# Patient Record
Sex: MALE | Race: WHITE | URBAN - METROPOLITAN AREA
[De-identification: names, ages, dates, MRNs, and addresses within clinical notes are randomized per-mention and may not be internally consistent; named-entity substitution may affect disease eponyms.]

---

## 2019-01-21 RX ORDER — OMEPRAZOLE 40 MG/1
CAPSULE, DELAYED RELEASE ORAL
Qty: 90 CAPSULE | Refills: 3 | OUTPATIENT
Start: 2019-01-21

## 2021-04-08 DIAGNOSIS — Z23 ENCOUNTER FOR IMMUNIZATION: ICD-10-CM

## 2025-04-23 ENCOUNTER — TELEPHONE (OUTPATIENT)
Age: 70
End: 2025-04-23

## 2025-04-23 NOTE — TELEPHONE ENCOUNTER
Patient called to schedule NP consult for sleep apnea-    He is now scheduled for 5/22/25 in [place] with Krystin

## 2025-05-22 ENCOUNTER — OFFICE VISIT (OUTPATIENT)
Dept: SLEEP CENTER | Facility: CLINIC | Age: 70
End: 2025-05-22

## 2025-05-22 VITALS
DIASTOLIC BLOOD PRESSURE: 84 MMHG | OXYGEN SATURATION: 96 % | SYSTOLIC BLOOD PRESSURE: 132 MMHG | WEIGHT: 232 LBS | BODY MASS INDEX: 31.42 KG/M2 | HEART RATE: 58 BPM | HEIGHT: 72 IN

## 2025-05-22 DIAGNOSIS — R06.83 SNORING: ICD-10-CM

## 2025-05-22 DIAGNOSIS — G47.19 EXCESSIVE DAYTIME SLEEPINESS: Primary | ICD-10-CM

## 2025-05-22 DIAGNOSIS — E66.811 OBESITY (BMI 30.0-34.9): ICD-10-CM

## 2025-05-22 DIAGNOSIS — G47.59 SEXSOMNIA: ICD-10-CM

## 2025-05-22 PROBLEM — G56.01 CARPAL TUNNEL SYNDROME OF RIGHT WRIST: Status: ACTIVE | Noted: 2020-01-03

## 2025-05-22 PROBLEM — Z86.0100 HISTORY OF COLONIC POLYPS: Status: ACTIVE | Noted: 2025-05-22

## 2025-05-22 PROBLEM — K64.8 OTHER HEMORRHOIDS: Status: ACTIVE | Noted: 2022-08-05

## 2025-05-22 PROBLEM — R74.01 ELEVATED TRANSAMINASE LEVEL: Status: ACTIVE | Noted: 2022-01-17

## 2025-05-22 PROBLEM — R73.01 IFG (IMPAIRED FASTING GLUCOSE): Status: ACTIVE | Noted: 2019-07-16

## 2025-05-22 PROBLEM — R35.1 NOCTURIA: Status: ACTIVE | Noted: 2018-07-10

## 2025-05-22 PROBLEM — N39.3 STRESS INCONTINENCE OF URINE: Status: ACTIVE | Noted: 2024-11-08

## 2025-05-22 PROBLEM — I10 HYPERTENSION: Status: ACTIVE | Noted: 2025-05-22

## 2025-05-22 PROBLEM — R31.0 GROSS HEMATURIA: Status: ACTIVE | Noted: 2023-10-30

## 2025-05-22 PROBLEM — K59.09 CONSTIPATION, CHRONIC: Status: ACTIVE | Noted: 2025-05-22

## 2025-05-22 PROBLEM — N52.31 ERECTILE DYSFUNCTION FOLLOWING RADICAL PROSTATECTOMY: Status: ACTIVE | Noted: 2017-10-30

## 2025-05-22 PROBLEM — I77.811 AORTIC ECTASIA, ABDOMINAL (HCC): Status: ACTIVE | Noted: 2024-03-29

## 2025-05-22 PROBLEM — R39.15 URINARY URGENCY: Status: ACTIVE | Noted: 2017-10-04

## 2025-05-22 PROBLEM — C44.90 NONMELANOMA SKIN CANCER: Status: ACTIVE | Noted: 2025-05-22

## 2025-05-22 PROBLEM — K22.70 BARRETT'S ESOPHAGUS WITHOUT DYSPLASIA: Status: ACTIVE | Noted: 2019-07-16

## 2025-05-22 PROBLEM — Z71.9 ENCOUNTER FOR EDUCATION: Status: ACTIVE | Noted: 2025-05-22

## 2025-05-22 PROBLEM — U07.1 COVID-19: Status: ACTIVE | Noted: 2025-05-22

## 2025-05-22 PROBLEM — N30.40 RADIATION CYSTITIS: Status: ACTIVE | Noted: 2024-01-31

## 2025-05-22 PROBLEM — R33.9 INCOMPLETE EMPTYING OF BLADDER: Status: ACTIVE | Noted: 2018-07-10

## 2025-05-22 PROBLEM — K21.9 GERD (GASTROESOPHAGEAL REFLUX DISEASE): Status: ACTIVE | Noted: 2025-05-22

## 2025-05-22 PROBLEM — Z87.891 PERSONAL HISTORY OF TOBACCO USE, PRESENTING HAZARDS TO HEALTH: Status: ACTIVE | Noted: 2018-12-27

## 2025-05-22 PROBLEM — R13.10 DYSPHAGIA: Status: ACTIVE | Noted: 2025-05-22

## 2025-05-22 RX ORDER — KETOCONAZOLE 20 MG/ML
1 SHAMPOO, SUSPENSION TOPICAL 2 TIMES WEEKLY
COMMUNITY
Start: 2010-01-01

## 2025-05-22 RX ORDER — FLUTICASONE PROPIONATE 50 MCG
2 SPRAY, SUSPENSION (ML) NASAL DAILY
COMMUNITY
Start: 2010-01-01

## 2025-05-22 RX ORDER — LOSARTAN POTASSIUM 50 MG/1
50 TABLET ORAL DAILY
COMMUNITY
Start: 2010-01-01

## 2025-05-22 RX ORDER — SILDENAFIL CITRATE 20 MG/1
20 TABLET ORAL AS NEEDED
COMMUNITY
Start: 2015-01-01

## 2025-05-22 RX ORDER — DICLOFENAC SODIUM 100 MG/1
100 TABLET, EXTENDED RELEASE ORAL AS NEEDED
COMMUNITY
Start: 2010-01-01

## 2025-05-22 RX ORDER — OMEPRAZOLE 40 MG/1
40 CAPSULE, DELAYED RELEASE ORAL DAILY
COMMUNITY
Start: 2010-01-01

## 2025-05-22 RX ORDER — LORATADINE 10 MG/1
1 CAPSULE, LIQUID FILLED ORAL DAILY PRN
COMMUNITY

## 2025-05-22 RX ORDER — ROSUVASTATIN CALCIUM 5 MG/1
5 TABLET, COATED ORAL DAILY
COMMUNITY

## 2025-05-22 NOTE — PROGRESS NOTES
Name: Roman Venegas      : 1955      MRN: [id number]  Encounter Provider: Krystin Yo PA-C  Encounter Date: 2025   Encounter department: [facility] [place]    :  Assessment & Plan  Excessive daytime sleepiness  Patient has symptoms which could be consistent with obstructive sleep apnea including snoring and excessive daytime sleepiness.  Physical exam findings include a BMI of 31, narrow airway, and high arched palate.  Home sleep study was ordered today.  If he was found to have sleep apnea, CPAP would be recommended.  If his sleep study is inconclusive or negative, I would recommend completing an in lab sleep study.  Orders:    Home Sleep Study; Future    Sexsomnia  Patient has been masturbating in his sleep for the past 1 to 2 months almost nightly.  We discussed it is possible that undiagnosed and untreated obstructive sleep apnea can lead to this behavior.  Will reevaluate symptoms after he completes the sleep study and treat sleep apnea if needed.       Snoring    Orders:    Home Sleep Study; Future    Obesity (BMI 30.0-34.9)  We discussed that obesity can increase his risk for obstructive sleep apnea.  Orders:    Home Sleep Study; Future      History of Present Illness     HPI Roman Venegas is a 69-year-old male with a significant past medical history of hypertension, abdominal aortic ectasia, GERD, Woodard's esophagitis, impaired fasting glucose, history of prostate cancer status post prostatectomy and radiation treatment who presents today for multiple sleep complaints.  Patient states he has been snoring and experiencing excessive daytime sleepiness most of his life.  He previously had a mandible advancing device made by his dentist for teeth grinding and snoring, which he felt to be helpful for his snoring.  He now has an over-the-counter device which he uses on occasion.  Although he has been excessively sleepy and snoring most of his life, he never completed a  sleep study or use CPAP previously.  He has 5 siblings and states 3 of his siblings use CPAP.    His main concern today is that his wife has noted in the past 1 to 2 months that he is masturbating in his sleep most nights approximately 30 minutes into his sleep.  He states he is completely unaware that this is occurring.  He states this upsets his wife.  She will either leave the bedroom or will wake him.  Patient states since having his prostate removed approximately 10 years ago, typically urine will come out of his penis when he is erect.  He states during these episodes in his sleep, he is not ejaculating or urinating.  He states if his wife wakes him he is able to return to sleep quickly and does not resume masturbation.  Other than snoring, his wife does not have any other complaints regarding his sleep.  She has not mentioned breathing pauses, gasping, choking, yelling out in his sleep, sleepwalking, sleep talking, or dream enactment behavior.  Patient states at times he has a vivid dream typically between 3 and 5 AM that he is urinating.  He states he does wake to go urinate in the bathroom.  He has not had any bedwetting since childhood.  He does describe himself as an excessively sleepy person.  He states he is always been able to fall asleep very easily if he is in a comfortable position.  He is currently retired and has been for the past 11 years and naps daily.    Sleep schedule  Patient is retired   Lives with his wife and shares a bed with her  Bedtime: 11 PM  Sleeps within 15 minutes or less  Almost a nightly occurrence of masturbation occurs in his sleep for the past 1 to 2 months  Wakes 2 times per night to urinate  Wake up time: 7 AM  Naps daily in his recliner for approximately 1 hour in the afternoon  Eats dinner between 5 and 6 PM and typically has 2 or 3 alcoholic beverages  Naps again briefly 10 to 15 minutes around 7 PM  Can fall asleep easily if comfortable    Sleep study/CPAP  No prior  use  Has an over-the-counter mandible advancing device which he uses occasionally    Sitting and reading: (Patient-Rptd) Moderate chance of dozing  Watching TV: (Patient-Rptd) Moderate chance of dozing  Sitting, inactive in a public place (e.g. a theatre or a meeting): (Patient-Rptd) Moderate chance of dozing  As a passenger in a car for an hour without a break: (Patient-Rptd) Moderate chance of dozing  Lying down to rest in the afternoon when circumstances permit: (Patient-Rptd) High chance of dozing  Sitting and talking to someone: (Patient-Rptd) Slight chance of dozing  Sitting quietly after a lunch without alcohol: (Patient-Rptd) Moderate chance of dozing  In a car, while stopped for a few minutes in traffic: (Patient-Rptd) Would never doze  Total score: (Patient-Rptd) 14       Review of Systems   Constitutional:  Negative for fatigue and unexpected weight change.   HENT:  Negative for congestion, nosebleeds and rhinorrhea.    Respiratory:  Negative for cough, shortness of breath and wheezing.    Cardiovascular:  Negative for palpitations and leg swelling.   Allergic/Immunologic: Negative for environmental allergies.   Neurological:  Negative for headaches.   Psychiatric/Behavioral:  Positive for sleep disturbance. Negative for decreased concentration. The patient is not nervous/anxious.      Pertinent positives/negatives included in HPI and also as noted:       Social History[1]  Objective   /84 (BP Location: Left arm, Patient Position: Sitting, Cuff Size: Large)   Pulse 58   Ht 6' (1.829 m)   Wt 105 kg (232 lb)   SpO2 96%   BMI 31.46 kg/m²     Neck Circumference: 18  Physical Exam  Visit Vitals  /84 (BP Location: Left arm, Patient Position: Sitting, Cuff Size: Large)   Pulse 58   Ht 6' (1.829 m)   Wt 105 kg (232 lb)   SpO2 96%   BMI 31.46 kg/m²   BSA 2.27 m²       Mallampati Grade : Mallampati 3-4  Oropharynx : crowded  Tonsil size : surgically removed   Tongue : Normal tongue size  Soft  "palate and uvula : Elongated soft palate  Hard Palate : high arched  Neck : excess fatty tissue  Nose : Normal nasal structure  Craniofacial anatomy : normal  Dental Stucture and Dentition : Normal dentition       Appearance : no distress, alert, cooperative  Mental Status. Memory, and Affect : alert and oriented, normal affect, makes good eye contact, provides a detailed history  Cardiac- : regular rate and rhythm, S1, S2 normal, no murmur, click, rub or gallop  Pulmonary : clear to auscultation bilaterally  Cranial Nerves: CN II-XII grossly intact  No peripheral edema    Data  No results found for: \"HGB\", \"HCT\", \"MCV\"   Lab Results   Component Value Date    CALCIUM 9.2 11/11/2024    K 4.1 11/11/2024    CO2 27 11/11/2024     11/11/2024    BUN 14 11/11/2024    CREATININE 0.91 11/11/2024     No results found for: \"IRON\", \"TIBC\", \"FERRITIN\"  Lab Results   Component Value Date    AST 18 11/11/2024    ALT 20 11/11/2024                  [1]      "

## 2025-05-22 NOTE — ASSESSMENT & PLAN NOTE
We discussed that obesity can increase his risk for obstructive sleep apnea.  Orders:    Home Sleep Study; Future

## 2025-05-22 NOTE — PATIENT INSTRUCTIONS
I placed an order today for a home sleep study.  Please schedule this test as soon as possible.  Once your study is completed, a nurse will call and review the results with you.  If you do have sleep apnea, I would recommend starting CPAP treatment.  If your sleep study is inconclusive or negative, I would recommend completing an in lab sleep study.

## 2025-05-25 ENCOUNTER — HOSPITAL ENCOUNTER (OUTPATIENT)
Dept: SLEEP CENTER | Facility: CLINIC | Age: 70
Discharge: HOME | End: 2025-05-25
Attending: PHYSICIAN ASSISTANT
Payer: MEDICARE

## 2025-05-25 DIAGNOSIS — G47.19 EXCESSIVE DAYTIME SLEEPINESS: ICD-10-CM

## 2025-05-25 DIAGNOSIS — R06.83 SNORING: ICD-10-CM

## 2025-05-25 DIAGNOSIS — E66.811 OBESITY (BMI 30.0-34.9): ICD-10-CM

## 2025-05-25 PROCEDURE — G0399 HOME SLEEP TEST/TYPE 3 PORTA: HCPCS

## 2025-05-26 NOTE — PROGRESS NOTES
Home Sleep Study Documentation    HOME STUDY DEVICE: Noxturnal yes                                           Irasema G3 no      Pre-Sleep Home Study:    Set-up and instructions performed by: BROOKE Benavidez, RST, CRT    Technician performed demonstration for Patient: yes    Return demonstration performed by Patient: yes    Written instructions provided to Patient: yes    Patient signed consent form: yes          Post-Sleep Home Study:        Additional comments by Patient:       Home Sleep Study Failed:no:    Failure reason: N/A    Reported or Detected: N/A    Scored by: BROOKE Rodrigez

## 2025-05-28 PROBLEM — G47.33 OSA (OBSTRUCTIVE SLEEP APNEA): Status: ACTIVE | Noted: 2025-05-28

## 2025-05-29 ENCOUNTER — RESULTS FOLLOW-UP (OUTPATIENT)
Dept: SLEEP CENTER | Facility: CLINIC | Age: 70
End: 2025-05-29

## 2025-05-29 DIAGNOSIS — G47.59 SEXSOMNIA: ICD-10-CM

## 2025-05-29 DIAGNOSIS — G47.33 OSA (OBSTRUCTIVE SLEEP APNEA): Primary | ICD-10-CM

## 2025-05-29 PROCEDURE — 95806 SLEEP STUDY UNATT&RESP EFFT: CPT | Performed by: PSYCHIATRY & NEUROLOGY

## 2025-06-10 NOTE — TELEPHONE ENCOUNTER
Per chart, patient viewed results via [employer] and scheduled CPAP study 11/5/25 in [place].  Is on wait list.